# Patient Record
Sex: MALE
[De-identification: names, ages, dates, MRNs, and addresses within clinical notes are randomized per-mention and may not be internally consistent; named-entity substitution may affect disease eponyms.]

---

## 2021-06-29 PROBLEM — Z00.00 ENCOUNTER FOR PREVENTIVE HEALTH EXAMINATION: Status: ACTIVE | Noted: 2021-06-29

## 2021-07-21 ENCOUNTER — APPOINTMENT (OUTPATIENT)
Dept: PULMONOLOGY | Facility: CLINIC | Age: 21
End: 2021-07-21
Payer: COMMERCIAL

## 2021-07-21 ENCOUNTER — APPOINTMENT (OUTPATIENT)
Age: 21
End: 2021-07-21
Payer: COMMERCIAL

## 2021-07-21 VITALS
BODY MASS INDEX: 19.91 KG/M2 | DIASTOLIC BLOOD PRESSURE: 80 MMHG | TEMPERATURE: 97.7 F | SYSTOLIC BLOOD PRESSURE: 122 MMHG | HEART RATE: 71 BPM | HEIGHT: 72 IN | OXYGEN SATURATION: 97 % | WEIGHT: 147 LBS

## 2021-07-21 DIAGNOSIS — R53.83 OTHER FATIGUE: ICD-10-CM

## 2021-07-21 DIAGNOSIS — G25.81 RESTLESS LEGS SYNDROME: ICD-10-CM

## 2021-07-21 PROCEDURE — ZZZZZ: CPT

## 2021-07-21 PROCEDURE — 94010 BREATHING CAPACITY TEST: CPT

## 2021-07-21 PROCEDURE — 99203 OFFICE O/P NEW LOW 30 MIN: CPT | Mod: 25

## 2021-07-21 PROCEDURE — 94727 GAS DIL/WSHOT DETER LNG VOL: CPT

## 2021-07-21 PROCEDURE — 94729 DIFFUSING CAPACITY: CPT

## 2021-07-21 PROCEDURE — 99072 ADDL SUPL MATRL&STAF TM PHE: CPT

## 2021-07-21 NOTE — HISTORY OF PRESENT ILLNESS
[Awakes Unrefreshed] : awakes unrefreshed [Difficulty Initiating Sleep] : difficulty initiating sleep [Difficulty Maintaining Sleep] : difficulty maintaining sleep [Frequent Nocturnal Awakening] : frequent nocturnal awakening [Unusual Sleep Behavior] : unusual sleep behavior [Lower Extremity Discomfort] : lower extremity discomfort [Irresistible urge to move legs] : irresistible urge to move legs [LE discomfort relieved by movement] : lower extremity discomfort relieved by movement [Late day/ Evening symptoms] : late day/ evening symptoms [Sleep Disturbances due to LE symptoms] : sleep disturbances due to lower extremity symptoms

## 2021-07-21 NOTE — ASSESSMENT
[FreeTextEntry1] : 20 year old male without significant PMHx presents for evaluation unusual movements during sleep, fatigue, inability to maintain sleep\par \par In Lab Sleep Study\par \par Follow up pending sleep study

## 2021-07-21 NOTE — PROCEDURE
[FreeTextEntry1] : PFT 7/21/21 personally reviewed normal lung volumes, normal spirometry, normal gas exchange

## 2021-07-23 ENCOUNTER — APPOINTMENT (OUTPATIENT)
Age: 21
End: 2021-07-23

## 2021-12-22 ENCOUNTER — NON-APPOINTMENT (OUTPATIENT)
Age: 21
End: 2021-12-22